# Patient Record
Sex: MALE | Race: BLACK OR AFRICAN AMERICAN | NOT HISPANIC OR LATINO
[De-identification: names, ages, dates, MRNs, and addresses within clinical notes are randomized per-mention and may not be internally consistent; named-entity substitution may affect disease eponyms.]

---

## 2024-05-07 ENCOUNTER — APPOINTMENT (OUTPATIENT)
Dept: ORTHOPEDIC SURGERY | Facility: CLINIC | Age: 44
End: 2024-05-07
Payer: COMMERCIAL

## 2024-05-07 VITALS — WEIGHT: 139 LBS | RESPIRATION RATE: 14 BRPM | HEIGHT: 68 IN | BODY MASS INDEX: 21.07 KG/M2

## 2024-05-07 PROBLEM — Z00.00 ENCOUNTER FOR PREVENTIVE HEALTH EXAMINATION: Status: ACTIVE | Noted: 2024-05-07

## 2024-05-07 PROCEDURE — 99203 OFFICE O/P NEW LOW 30 MIN: CPT

## 2024-05-07 PROCEDURE — 76882 US LMTD JT/FCL EVL NVASC XTR: CPT

## 2024-05-07 PROCEDURE — 73130 X-RAY EXAM OF HAND: CPT | Mod: RT

## 2024-05-07 NOTE — HISTORY OF PRESENT ILLNESS
[Right] : right hand dominant [FreeTextEntry1] : Patient here for initial evaluation of right dorsal hand mass in the first webspace.  Patient noticed the mass approximately 1 month ago.  The mass is nontender.  He states that it has not changed in size since he noticed it 1 month ago.  He has no other similar masses.

## 2024-05-07 NOTE — ASSESSMENT
[FreeTextEntry1] : Right hand first webspace soft tissue mass of 4 x 4 cm.  Clinically this resembles a large lipoma but differential diagnosis was discussed with the patient.  The risks, benefits, alternatives and associated differential diagnosis was discussed with the patient. Options ranged from conservative care, therapy to surgical intervention were reviewed and all questions answered. Risks included incomplete resolution of symptoms, worsening of symptoms recurrence, tissue loss, functional loss and other risks associated with treatment of this condition Patient appeared to have an excellent understanding of the risks as well as differential diagnosis associated with this condition.  Elected to proceed with MRI with gadolinium to better evaluate the mass and based on the results a definitive plan will be developed.

## 2024-05-07 NOTE — CONSULT LETTER
[Dear  ___] : Dear  [unfilled], [Consult Letter:] : I had the pleasure of evaluating your patient, [unfilled]. [Please see my note below.] : Please see my note below. [Consult Closing:] : Thank you very much for allowing me to participate in the care of this patient.  If you have any questions, please do not hesitate to contact me. [Sincerely,] : Sincerely, [FreeTextEntry3] : Marino Griffin M.D., FAAOS Co-Director [facility] [place] [facility]

## 2024-05-07 NOTE — PHYSICAL EXAM
[de-identified] : There is a 4 x 3 cm firm soft tissue mass within the first webspace of the right hand involving the interossei muscle.  Appears to be deep and not subcutaneous.  There were no masses in the axilla or antebrachial fascia.  The wrists have a symmetric range of motion bilaterally. There is no tenderness over the scaphoid, scapholunate or lunotriquetral ligaments bilaterally. There is a negative De La Cruz test bilaterally. There is negative tenderness over the radial ulnar joint or TFCC and no evidence of instability bilaterally. No tenderness over the pisotriquetral or hamate hook or CMC joint bilaterally. There is 5 over 5 strength of the wrists bilaterally.  Tenderness over the CMC joint MP PIP or DIP joint. No tenderness over the flexor or extensor tendons to the wrist or digits.  Negative Finkelstein test negative grind test. There is 2+ pulses of the radial arteries bilaterally. Ivan's test shows excellent flow through the radial and ulnar arteries with an intact arch bilaterally.  There is good capillary refill of the digits bilaterally.There is no masses or sensitivity over the median and ulnar nerves at the level of the wrist. There is a negative Tinel's and negative Phalen's sign bilaterally. The sensation is grossly intact bilaterally. [de-identified] : PA lateral and oblique of the right hand shows no evidence of soft tissue calcifications in the area of clinical concern.  The joint spaces are well-preserved and there is no evidence of arthritis.  Ultrasound of the soft tissue mass in the right first webspace shows a heterogeneous signal with relatively good encapsulation.

## 2024-05-17 ENCOUNTER — APPOINTMENT (OUTPATIENT)
Dept: ORTHOPEDIC SURGERY | Facility: CLINIC | Age: 44
End: 2024-05-17
Payer: COMMERCIAL

## 2024-05-17 PROCEDURE — 99442: CPT

## 2024-06-04 ENCOUNTER — TRANSCRIPTION ENCOUNTER (OUTPATIENT)
Age: 44
End: 2024-06-04

## 2024-06-04 RX ORDER — CHLORHEXIDINE GLUCONATE 213 G/1000ML
1 SOLUTION TOPICAL DAILY
Refills: 0 | Status: DISCONTINUED | OUTPATIENT
Start: 2024-06-05 | End: 2024-06-05

## 2024-06-04 RX ORDER — OXYCODONE AND ACETAMINOPHEN 5; 325 MG/1; MG/1
5-325 TABLET ORAL
Qty: 15 | Refills: 0 | Status: ACTIVE | COMMUNITY
Start: 2024-06-04 | End: 1900-01-01

## 2024-06-04 NOTE — ASU DISCHARGE PLAN (ADULT/PEDIATRIC) - CARE PROVIDER_API CALL
Marino Griffin  [facility]  [address]  [place] [address]  Phone: [phone]  Fax: [phone]  Follow Up Time:

## 2024-06-04 NOTE — ASU PATIENT PROFILE, ADULT - FALL HARM RISK - UNIVERSAL INTERVENTIONS
Bed in lowest position, wheels locked, appropriate side rails in place/Call bell, personal items and telephone in reach/Instruct patient to call for assistance before getting out of bed or chair/Non-slip footwear when patient is out of bed/[place] to call system/Physically safe environment - no spills, clutter or unnecessary equipment/Purposeful Proactive Rounding/Room/bathroom lighting operational, light cord in reach

## 2024-06-04 NOTE — ASU DISCHARGE PLAN (ADULT/PEDIATRIC) - ASU DC SPECIAL INSTRUCTIONSFT
Co-Directors: Marino Griffin MD; MD Rigo Cunningham MD   [facility] [place] [facility] [address] 	  [place] [address] 	 	  Phone [phone] (HAND), Fax [phone]    [email or web address]    Hand Surgery Post Operative Instructions      DRESSING CARE:  1.Please keep bandage ON and DRY until you return to the office for your 1st postoperative visit.   2.In the shower you must cover bandage with a plastic bag. You can use tape or a rubber band so no water leaks into the bag.   3.Please do not exercise as that leads to excessive sweating as the bandage will therefore become moist/ wet.    4.Do not remove or change your bandage. You may apply more tape if dressing starts to unravel.   5.Please do not insert any objects, such as a pencil, down into the bandage.     ELEVATION:  1.Keep hand/wrist above heart level at all times or until bandage feels loose. This will help with swelling of the fingers and can be accomplished by using the FOAM PILLOW.   2.A sling will not hold your hand/wrist above your heart and therefore its use should be limited (it may also cause shoulder stiffness).     ACTIVITY:  1.Moving your fingers daily after surgery is very important to prevent stiffness. Please open your fingers completely and close your fingers completely to achieve full range of motion.  **UNLESS TENDON REPAIR OR NERVE REPAIR SURGERY PERFORMED    2.Move all joints of the extremity that are not immobilized to prevent stiffness (i.e. shoulder, elbow, fingers, and thumb unless instructed otherwise).   3.Avoid activities which may re-injure your hand or finger.     DIET:   Regular diet. Start light and progress as tolerated.     PAIN MEDICINE:   1.Pain medicine was sent to your pharmacy.  2.Take pain medicine on an “AS NEEDED” basis according to your doctor’s instructions.   3.Your pain will decrease over the next few days allowing you to:   •Decrease your pain medicine quantity until you stop.   •Increase the time between doses until you stop.   4.You should not drink alcoholic beverages while on pain medication.   5.Take pain medicine with food to prevent nausea.   6.Constipation can occur. If no bowel movement occurs within 48 hours take a laxative of your choice (over the counter).	 	      CONTACT PHYSICIAN FOR:   Slight pain, swelling and bluish discoloration are to be expected. If you have breathing difficulty or chest pain dial 911 immediately. However, if the following symptoms occur notify your physician:   •Temperature above 101° F 	        • Inability to urinate in 8 hours   •Uncontrolled nausea/vomiting   	  • Progressively increasing pain   •Signs of wound infection 	(Redness, swelling, pus-like drainage)                 • Excessive bleeding  • Increasing numbness   •Excessive swelling and tightness 	  • Splint or cast that is too tight      OFFICE APPOINTMENT:    A staff member from the office will call you in the next 1-2 days to schedule your 1st Post Operative appointment to see your physician back in the office. *IF someone does not reach out to you in the next 1-2 days please call the office.

## 2024-06-04 NOTE — ASU DISCHARGE PLAN (ADULT/PEDIATRIC) - NS MD DC FALL RISK RISK
For information on Fall & Injury Prevention, visit: [email or web address] OR  [email or web address] OR  https://www.cdc.gov/steadi/patient.html

## 2024-06-05 ENCOUNTER — RESULT REVIEW (OUTPATIENT)
Age: 44
End: 2024-06-05

## 2024-06-05 ENCOUNTER — OUTPATIENT (OUTPATIENT)
Dept: OUTPATIENT SERVICES | Facility: HOSPITAL | Age: 44
LOS: 1 days | Discharge: HOME | End: 2024-06-05
Payer: COMMERCIAL

## 2024-06-05 ENCOUNTER — APPOINTMENT (OUTPATIENT)
Dept: ORTHOPEDIC SURGERY | Facility: AMBULATORY SURGERY CENTER | Age: 44
End: 2024-06-05
Payer: COMMERCIAL

## 2024-06-05 VITALS
RESPIRATION RATE: 16 BRPM | TEMPERATURE: 98 F | SYSTOLIC BLOOD PRESSURE: 111 MMHG | HEART RATE: 70 BPM | DIASTOLIC BLOOD PRESSURE: 64 MMHG | OXYGEN SATURATION: 96 %

## 2024-06-05 VITALS
DIASTOLIC BLOOD PRESSURE: 61 MMHG | HEIGHT: 68 IN | HEART RATE: 66 BPM | WEIGHT: 139.77 LBS | RESPIRATION RATE: 16 BRPM | TEMPERATURE: 98 F | SYSTOLIC BLOOD PRESSURE: 105 MMHG | OXYGEN SATURATION: 98 %

## 2024-06-05 PROCEDURE — 88304 TISSUE EXAM BY PATHOLOGIST: CPT | Mod: 26

## 2024-06-05 PROCEDURE — 26113 EXC HAND TUM DEEP 1.5 CM/>: CPT | Mod: RT

## 2024-06-05 RX ORDER — ACETAMINOPHEN 500 MG
1000 TABLET ORAL ONCE
Refills: 0 | Status: DISCONTINUED | OUTPATIENT
Start: 2024-06-05 | End: 2024-06-05

## 2024-06-05 RX ORDER — SODIUM CHLORIDE 9 MG/ML
1000 INJECTION, SOLUTION INTRAVENOUS
Refills: 0 | Status: DISCONTINUED | OUTPATIENT
Start: 2024-06-05 | End: 2024-06-05

## 2024-06-05 RX ADMIN — CHLORHEXIDINE GLUCONATE 1 APPLICATION(S): 213 SOLUTION TOPICAL at 08:18

## 2024-06-05 NOTE — PRE-ANESTHESIA EVALUATION ADULT - NSANTHOSAYNRD_GEN_A_CORE
No. SHARONA screening performed.  STOP BANG Legend: 0-2 = LOW Risk; 3-4 = INTERMEDIATE Risk; 5-8 = HIGH Risk

## 2024-06-17 ENCOUNTER — APPOINTMENT (OUTPATIENT)
Dept: ORTHOPEDIC SURGERY | Facility: CLINIC | Age: 44
End: 2024-06-17
Payer: COMMERCIAL

## 2024-06-17 DIAGNOSIS — R22.31 LOCALIZED SWELLING, MASS AND LUMP, RIGHT UPPER LIMB: ICD-10-CM

## 2024-06-17 LAB — SURGICAL PATHOLOGY STUDY: SIGNIFICANT CHANGE UP

## 2024-06-17 PROCEDURE — 99024 POSTOP FOLLOW-UP VISIT: CPT

## 2024-06-17 NOTE — HISTORY OF PRESENT ILLNESS
[___ Days Post Op] : post op day #[unfilled] [de-identified] : DOS: 6/5/24 [de-identified] : 12 days status post excision of 5 x 2 cm dorsal interossei soft tissue mass [de-identified] : Incision line is well-healed there is no evidence of infection and moving the wrist and digits well without evidence of crepitus or instability good capillary refill negative Tinel's sensation grossly intact. [de-identified] : 12 days status post excision of 5 x 2 cm dorsal interossei soft tissue mass [de-identified] : Pathology pending.  Home program outlined as well as referral to outside therapy  Return to the office in 4 weeks

## 2024-06-18 ENCOUNTER — NON-APPOINTMENT (OUTPATIENT)
Age: 44
End: 2024-06-18

## 2024-07-15 ENCOUNTER — APPOINTMENT (OUTPATIENT)
Dept: ORTHOPEDIC SURGERY | Facility: CLINIC | Age: 44
End: 2024-07-15

## 2024-11-17 NOTE — ASU PATIENT PROFILE, ADULT - HOW PATIENT ADDRESSED, PROFILE
"  Sore Throat    You are being teated for acute pharyngitis. The most likely cause is due to a virus.    Rapid strep: Negative  Strep PCR: Pending.      You will be notified of any positive test result within 24 to 48 hours    PLAN:  1.  If not contraindicated, please take Tylenol every 6 hours as needed for pain and fever. Alternate with ibuprofen every 6 hours as needed for pain and fever.    2.  Age permitting:  You can use Chloraseptic spray to help with sore throat.  You can try MUCINEX InstaSoothe Sore Throat + Soothing Comfort - Honey & Echinacea.  Be sure to follow age appropriate instructions.    Use and take all medication as age directed      3.  Gargle with salt water 4-5 times a day to help with sore throat.  4.  Stay well-hydrated and drink lots of fluids.  5.  Suck on lozenges to help moisten throat.  6.  Use saline nasal spray twice a day to keep mucous membranes moist.  7.  Follow up with your primary care physician in 3-5 days if symptoms persist.  8.  Return if symptoms worsen or new symptoms develop.      -You can treat with Chloraseptic throat spray for patients 3 years and older (use as directed) -Be sure to follow all labeling and instructions when taking over-the-counter cold medications.  -Saltwater gargles every 2 hours to pull inflammation  -Take Motrin, Tylenol for pain (use as directed)    A few helpful tips to reduce pain from a sore throat:    Diet of smooth, slippery and wet foods  Ice cream  Jell-O  Hard sucking candies (e.g. butterscotch)  Honey (2 tsp) to coat throat  Avoid in age under 1 year (Botulism risk)  Soothing throat lozenges  Menthol lozenges (e.g. Vicks)  Pectin (e.g. Halls Breezers)  Herbal tea containing \"Demulcents\" (e.g. Throat coat)  Contains Elm bark, licorice root and marshmallow root  Offers short-term relief of Pharyngitis (<30 minutes)        You most likely have a viral pharyngitis.  Antibiotics are not needed at this time.  People with strep throat or other " bacterial infections are given antibiotics. The cause of your sore throat is most likely viral. It is important to relieve the pain of sore throat so that people can eat and drink. Ibuprofen or acetaminophen helps relieve pain and fever. Warm saltwater gargles and throat lozenges or throat sprays (such as those containing benzocaine, lidocaine, or dyclonine) may temporarily help relieve pain. Providing soup is a good way to keep children well hydrated and nourished when swallowing is painful and before their appetite has returned.     No preference

## (undated) DEVICE — TOURNIQUET CUFF 18" DUAL PORT SINGLE BLADDER W PLC  (BLACK)

## (undated) DEVICE — MARKING PEN W RULER

## (undated) DEVICE — SUT MONOCRYL PLUS 4-0 18" PS-2 UNDYED

## (undated) DEVICE — GLV 8 PROTEXIS (WHITE)

## (undated) DEVICE — VENODYNE/SCD SLEEVE CALF MEDIUM

## (undated) DEVICE — SUT SILK 4-0 18" PS-2

## (undated) DEVICE — SUT ETHILON 5-0 18" P-3

## (undated) DEVICE — SUT VICRYL 4-0 18" PS-2 UNDYED

## (undated) DEVICE — WARMING BLANKET LOWER ADULT

## (undated) DEVICE — PACK HAND